# Patient Record
Sex: MALE | Race: WHITE | HISPANIC OR LATINO | Employment: FULL TIME | URBAN - METROPOLITAN AREA
[De-identification: names, ages, dates, MRNs, and addresses within clinical notes are randomized per-mention and may not be internally consistent; named-entity substitution may affect disease eponyms.]

---

## 2023-01-09 ENCOUNTER — HOSPITAL ENCOUNTER (OUTPATIENT)
Facility: HOSPITAL | Age: 48
Discharge: HOME | End: 2023-01-11
Attending: EMERGENCY MEDICINE | Admitting: HOSPITALIST

## 2023-01-09 DIAGNOSIS — J45.909 ASTHMA: ICD-10-CM

## 2023-01-09 DIAGNOSIS — J96.91 RESPIRATORY FAILURE WITH HYPOXIA: ICD-10-CM

## 2023-01-09 DIAGNOSIS — R00.0 TACHYCARDIA: ICD-10-CM

## 2023-01-09 DIAGNOSIS — R07.9 CHEST PAIN: ICD-10-CM

## 2023-01-09 DIAGNOSIS — R06.00 DYSPNEA: ICD-10-CM

## 2023-01-09 DIAGNOSIS — R09.89 SUSPECTED CHF (CONGESTIVE HEART FAILURE): ICD-10-CM

## 2023-01-09 DIAGNOSIS — J45.41 MODERATE PERSISTENT ASTHMA WITH EXACERBATION: Primary | ICD-10-CM

## 2023-01-09 PROBLEM — R06.2 WHEEZING: Status: ACTIVE | Noted: 2023-01-09

## 2023-01-09 PROBLEM — Z72.89 CURRENT VAPING ON SOME DAYS: Status: ACTIVE | Noted: 2023-01-09

## 2023-01-09 PROBLEM — R03.0 ELEVATED BLOOD PRESSURE READING WITHOUT DIAGNOSIS OF HYPERTENSION: Status: ACTIVE | Noted: 2023-01-09

## 2023-01-09 LAB
ALBUMIN SERPL BCP-MCNC: 4.7 G/DL (ref 3.5–5.2)
ALP SERPL-CCNC: 68 U/L (ref 55–135)
ALT SERPL W/O P-5'-P-CCNC: 38 U/L (ref 10–44)
ANION GAP SERPL CALC-SCNC: 9 MMOL/L (ref 8–16)
AST SERPL-CCNC: 27 U/L (ref 10–40)
BASOPHILS # BLD AUTO: 0.14 K/UL (ref 0–0.2)
BASOPHILS NFR BLD: 1 % (ref 0–1.9)
BILIRUB SERPL-MCNC: 0.5 MG/DL (ref 0.1–1)
BNP SERPL-MCNC: 23 PG/ML (ref 0–99)
BUN SERPL-MCNC: 14 MG/DL (ref 6–20)
CALCIUM SERPL-MCNC: 9.8 MG/DL (ref 8.7–10.5)
CHLORIDE SERPL-SCNC: 105 MMOL/L (ref 95–110)
CO2 SERPL-SCNC: 26 MMOL/L (ref 23–29)
CREAT SERPL-MCNC: 0.9 MG/DL (ref 0.5–1.4)
DIFFERENTIAL METHOD: ABNORMAL
EOSINOPHIL # BLD AUTO: 2 K/UL (ref 0–0.5)
EOSINOPHIL NFR BLD: 14.8 % (ref 0–8)
ERYTHROCYTE [DISTWIDTH] IN BLOOD BY AUTOMATED COUNT: 13.1 % (ref 11.5–14.5)
EST. GFR  (NO RACE VARIABLE): >60 ML/MIN/1.73 M^2
GLUCOSE SERPL-MCNC: 120 MG/DL (ref 70–110)
HCT VFR BLD AUTO: 46.1 % (ref 40–54)
HGB BLD-MCNC: 15.6 G/DL (ref 14–18)
IMM GRANULOCYTES # BLD AUTO: 0.04 K/UL (ref 0–0.04)
IMM GRANULOCYTES NFR BLD AUTO: 0.3 % (ref 0–0.5)
INFLUENZA A, MOLECULAR: NEGATIVE
INFLUENZA B, MOLECULAR: NEGATIVE
LYMPHOCYTES # BLD AUTO: 1.2 K/UL (ref 1–4.8)
LYMPHOCYTES NFR BLD: 8.6 % (ref 18–48)
MCH RBC QN AUTO: 30.4 PG (ref 27–31)
MCHC RBC AUTO-ENTMCNC: 33.8 G/DL (ref 32–36)
MCV RBC AUTO: 90 FL (ref 82–98)
MONOCYTES # BLD AUTO: 0.6 K/UL (ref 0.3–1)
MONOCYTES NFR BLD: 4.2 % (ref 4–15)
NEUTROPHILS # BLD AUTO: 9.6 K/UL (ref 1.8–7.7)
NEUTROPHILS NFR BLD: 71.1 % (ref 38–73)
NRBC BLD-RTO: 0 /100 WBC
PLATELET # BLD AUTO: 237 K/UL (ref 150–450)
PMV BLD AUTO: 10 FL (ref 9.2–12.9)
POTASSIUM SERPL-SCNC: 4.1 MMOL/L (ref 3.5–5.1)
PROT SERPL-MCNC: 8.2 G/DL (ref 6–8.4)
RBC # BLD AUTO: 5.13 M/UL (ref 4.6–6.2)
SARS-COV-2 RDRP RESP QL NAA+PROBE: NEGATIVE
SODIUM SERPL-SCNC: 140 MMOL/L (ref 136–145)
SPECIMEN SOURCE: NORMAL
TROPONIN I SERPL HS-MCNC: 7.4 PG/ML (ref 0–14.9)
WBC # BLD AUTO: 13.48 K/UL (ref 3.9–12.7)

## 2023-01-09 PROCEDURE — 85025 COMPLETE CBC W/AUTO DIFF WBC: CPT | Performed by: NURSE PRACTITIONER

## 2023-01-09 PROCEDURE — 25000242 PHARM REV CODE 250 ALT 637 W/ HCPCS: Performed by: NURSE PRACTITIONER

## 2023-01-09 PROCEDURE — 93010 ELECTROCARDIOGRAM REPORT: CPT | Mod: ,,, | Performed by: INTERNAL MEDICINE

## 2023-01-09 PROCEDURE — 99285 EMERGENCY DEPT VISIT HI MDM: CPT | Mod: 25

## 2023-01-09 PROCEDURE — 63600175 PHARM REV CODE 636 W HCPCS: Performed by: EMERGENCY MEDICINE

## 2023-01-09 PROCEDURE — 99900031 HC PATIENT EDUCATION (STAT)

## 2023-01-09 PROCEDURE — G0378 HOSPITAL OBSERVATION PER HR: HCPCS

## 2023-01-09 PROCEDURE — 25000242 PHARM REV CODE 250 ALT 637 W/ HCPCS: Performed by: EMERGENCY MEDICINE

## 2023-01-09 PROCEDURE — 84484 ASSAY OF TROPONIN QUANT: CPT | Performed by: EMERGENCY MEDICINE

## 2023-01-09 PROCEDURE — 99900035 HC TECH TIME PER 15 MIN (STAT)

## 2023-01-09 PROCEDURE — 96365 THER/PROPH/DIAG IV INF INIT: CPT

## 2023-01-09 PROCEDURE — 83880 ASSAY OF NATRIURETIC PEPTIDE: CPT | Performed by: EMERGENCY MEDICINE

## 2023-01-09 PROCEDURE — 80053 COMPREHEN METABOLIC PANEL: CPT | Performed by: NURSE PRACTITIONER

## 2023-01-09 PROCEDURE — 27000221 HC OXYGEN, UP TO 24 HOURS

## 2023-01-09 PROCEDURE — 63600175 PHARM REV CODE 636 W HCPCS: Performed by: HOSPITALIST

## 2023-01-09 PROCEDURE — 94640 AIRWAY INHALATION TREATMENT: CPT

## 2023-01-09 PROCEDURE — 94761 N-INVAS EAR/PLS OXIMETRY MLT: CPT

## 2023-01-09 PROCEDURE — 96375 TX/PRO/DX INJ NEW DRUG ADDON: CPT

## 2023-01-09 PROCEDURE — 93010 EKG 12-LEAD: ICD-10-PCS | Mod: ,,, | Performed by: INTERNAL MEDICINE

## 2023-01-09 PROCEDURE — 25000003 PHARM REV CODE 250: Performed by: HOSPITALIST

## 2023-01-09 PROCEDURE — 93005 ELECTROCARDIOGRAM TRACING: CPT | Performed by: INTERNAL MEDICINE

## 2023-01-09 PROCEDURE — 87502 INFLUENZA DNA AMP PROBE: CPT | Performed by: NURSE PRACTITIONER

## 2023-01-09 PROCEDURE — U0002 COVID-19 LAB TEST NON-CDC: HCPCS | Performed by: NURSE PRACTITIONER

## 2023-01-09 RX ORDER — ACETAMINOPHEN 325 MG/1
650 TABLET ORAL EVERY 4 HOURS PRN
Status: DISCONTINUED | OUTPATIENT
Start: 2023-01-09 | End: 2023-01-11 | Stop reason: HOSPADM

## 2023-01-09 RX ORDER — SODIUM CHLORIDE 0.9 % (FLUSH) 0.9 %
3 SYRINGE (ML) INJECTION EVERY 12 HOURS PRN
Status: DISCONTINUED | OUTPATIENT
Start: 2023-01-09 | End: 2023-01-11 | Stop reason: HOSPADM

## 2023-01-09 RX ORDER — DOXYCYCLINE 100 MG/1
100 CAPSULE ORAL EVERY 12 HOURS
Status: DISCONTINUED | OUTPATIENT
Start: 2023-01-09 | End: 2023-01-11 | Stop reason: HOSPADM

## 2023-01-09 RX ORDER — IPRATROPIUM BROMIDE AND ALBUTEROL SULFATE 2.5; .5 MG/3ML; MG/3ML
3 SOLUTION RESPIRATORY (INHALATION) EVERY 4 HOURS
Status: DISCONTINUED | OUTPATIENT
Start: 2023-01-10 | End: 2023-01-09

## 2023-01-09 RX ORDER — IPRATROPIUM BROMIDE AND ALBUTEROL SULFATE 2.5; .5 MG/3ML; MG/3ML
3 SOLUTION RESPIRATORY (INHALATION)
Status: COMPLETED | OUTPATIENT
Start: 2023-01-09 | End: 2023-01-09

## 2023-01-09 RX ORDER — IBUPROFEN 200 MG
24 TABLET ORAL
Status: DISCONTINUED | OUTPATIENT
Start: 2023-01-09 | End: 2023-01-11 | Stop reason: HOSPADM

## 2023-01-09 RX ORDER — IPRATROPIUM BROMIDE 0.5 MG/2.5ML
0.5 SOLUTION RESPIRATORY (INHALATION)
Status: COMPLETED | OUTPATIENT
Start: 2023-01-09 | End: 2023-01-09

## 2023-01-09 RX ORDER — IBUPROFEN 200 MG
16 TABLET ORAL
Status: DISCONTINUED | OUTPATIENT
Start: 2023-01-09 | End: 2023-01-11 | Stop reason: HOSPADM

## 2023-01-09 RX ORDER — HYDRALAZINE HYDROCHLORIDE 20 MG/ML
10 INJECTION INTRAMUSCULAR; INTRAVENOUS EVERY 8 HOURS PRN
Status: DISCONTINUED | OUTPATIENT
Start: 2023-01-09 | End: 2023-01-11 | Stop reason: HOSPADM

## 2023-01-09 RX ORDER — IPRATROPIUM BROMIDE AND ALBUTEROL SULFATE 2.5; .5 MG/3ML; MG/3ML
3 SOLUTION RESPIRATORY (INHALATION) EVERY 6 HOURS
Status: DISCONTINUED | OUTPATIENT
Start: 2023-01-10 | End: 2023-01-11 | Stop reason: HOSPADM

## 2023-01-09 RX ORDER — FUROSEMIDE 10 MG/ML
40 INJECTION INTRAMUSCULAR; INTRAVENOUS ONCE
Status: COMPLETED | OUTPATIENT
Start: 2023-01-09 | End: 2023-01-09

## 2023-01-09 RX ORDER — ACETAMINOPHEN 325 MG/1
650 TABLET ORAL EVERY 8 HOURS PRN
Status: DISCONTINUED | OUTPATIENT
Start: 2023-01-09 | End: 2023-01-11 | Stop reason: HOSPADM

## 2023-01-09 RX ORDER — SODIUM,POTASSIUM PHOSPHATES 280-250MG
2 POWDER IN PACKET (EA) ORAL
Status: DISCONTINUED | OUTPATIENT
Start: 2023-01-09 | End: 2023-01-11 | Stop reason: HOSPADM

## 2023-01-09 RX ORDER — SIMETHICONE 80 MG
1 TABLET,CHEWABLE ORAL 4 TIMES DAILY PRN
Status: DISCONTINUED | OUTPATIENT
Start: 2023-01-09 | End: 2023-01-11 | Stop reason: HOSPADM

## 2023-01-09 RX ORDER — POLYETHYLENE GLYCOL 3350 17 G/17G
17 POWDER, FOR SOLUTION ORAL 2 TIMES DAILY
Status: DISCONTINUED | OUTPATIENT
Start: 2023-01-09 | End: 2023-01-11 | Stop reason: HOSPADM

## 2023-01-09 RX ORDER — HYDROCODONE BITARTRATE AND ACETAMINOPHEN 5; 325 MG/1; MG/1
1 TABLET ORAL EVERY 6 HOURS PRN
Status: DISCONTINUED | OUTPATIENT
Start: 2023-01-09 | End: 2023-01-11 | Stop reason: HOSPADM

## 2023-01-09 RX ORDER — GLUCAGON 1 MG
1 KIT INJECTION
Status: DISCONTINUED | OUTPATIENT
Start: 2023-01-09 | End: 2023-01-11 | Stop reason: HOSPADM

## 2023-01-09 RX ORDER — LANOLIN ALCOHOL/MO/W.PET/CERES
800 CREAM (GRAM) TOPICAL
Status: DISCONTINUED | OUTPATIENT
Start: 2023-01-09 | End: 2023-01-11 | Stop reason: HOSPADM

## 2023-01-09 RX ORDER — NALOXONE HCL 0.4 MG/ML
0.02 VIAL (ML) INJECTION
Status: DISCONTINUED | OUTPATIENT
Start: 2023-01-09 | End: 2023-01-11 | Stop reason: HOSPADM

## 2023-01-09 RX ORDER — ONDANSETRON 2 MG/ML
4 INJECTION INTRAMUSCULAR; INTRAVENOUS EVERY 8 HOURS PRN
Status: DISCONTINUED | OUTPATIENT
Start: 2023-01-09 | End: 2023-01-11 | Stop reason: HOSPADM

## 2023-01-09 RX ORDER — METHYLPREDNISOLONE SOD SUCC 125 MG
80 VIAL (EA) INJECTION
Status: DISCONTINUED | OUTPATIENT
Start: 2023-01-10 | End: 2023-01-11 | Stop reason: HOSPADM

## 2023-01-09 RX ORDER — TALC
6 POWDER (GRAM) TOPICAL NIGHTLY PRN
Status: DISCONTINUED | OUTPATIENT
Start: 2023-01-09 | End: 2023-01-11 | Stop reason: HOSPADM

## 2023-01-09 RX ORDER — HYDROCODONE BITARTRATE AND ACETAMINOPHEN 10; 325 MG/1; MG/1
1 TABLET ORAL EVERY 6 HOURS PRN
Status: DISCONTINUED | OUTPATIENT
Start: 2023-01-09 | End: 2023-01-11 | Stop reason: HOSPADM

## 2023-01-09 RX ORDER — MAGNESIUM SULFATE HEPTAHYDRATE 40 MG/ML
2 INJECTION, SOLUTION INTRAVENOUS ONCE
Status: COMPLETED | OUTPATIENT
Start: 2023-01-09 | End: 2023-01-09

## 2023-01-09 RX ADMIN — MAGNESIUM SULFATE HEPTAHYDRATE 2 G: 40 INJECTION, SOLUTION INTRAVENOUS at 08:01

## 2023-01-09 RX ADMIN — IPRATROPIUM BROMIDE 0.5 MG: 0.5 SOLUTION RESPIRATORY (INHALATION) at 07:01

## 2023-01-09 RX ADMIN — POLYETHYLENE GLYCOL 3350 17 G: 17 POWDER, FOR SOLUTION ORAL at 11:01

## 2023-01-09 RX ADMIN — METHYLPREDNISOLONE SODIUM SUCCINATE 80 MG: 40 INJECTION, POWDER, FOR SOLUTION INTRAMUSCULAR; INTRAVENOUS at 08:01

## 2023-01-09 RX ADMIN — DOXYCYCLINE HYCLATE 100 MG: 100 CAPSULE ORAL at 11:01

## 2023-01-09 RX ADMIN — FUROSEMIDE 40 MG: 10 INJECTION, SOLUTION INTRAVENOUS at 11:01

## 2023-01-09 RX ADMIN — IPRATROPIUM BROMIDE AND ALBUTEROL SULFATE 3 ML: 2.5; .5 SOLUTION RESPIRATORY (INHALATION) at 07:01

## 2023-01-10 ENCOUNTER — CLINICAL SUPPORT (OUTPATIENT)
Dept: CARDIOLOGY | Facility: HOSPITAL | Age: 48
End: 2023-01-10
Attending: HOSPITALIST

## 2023-01-10 VITALS — WEIGHT: 192 LBS | HEIGHT: 71 IN | BODY MASS INDEX: 26.88 KG/M2

## 2023-01-10 LAB
ALBUMIN SERPL BCP-MCNC: 4.6 G/DL (ref 3.5–5.2)
ALP SERPL-CCNC: 61 U/L (ref 55–135)
ALT SERPL W/O P-5'-P-CCNC: 35 U/L (ref 10–44)
ANION GAP SERPL CALC-SCNC: 12 MMOL/L (ref 8–16)
AORTIC ROOT ANNULUS: 3.16 CM
AORTIC VALVE CUSP SEPERATION: 1.96 CM
AST SERPL-CCNC: 23 U/L (ref 10–40)
AV INDEX (PROSTH): 0.88
AV MEAN GRADIENT: 3 MMHG
AV PEAK GRADIENT: 6 MMHG
AV VALVE AREA: 2.84 CM2
AV VELOCITY RATIO: 0.81
BILIRUB SERPL-MCNC: 0.5 MG/DL (ref 0.1–1)
BSA FOR ECHO PROCEDURE: 2.09 M2
BUN SERPL-MCNC: 14 MG/DL (ref 6–20)
CALCIUM SERPL-MCNC: 9.5 MG/DL (ref 8.7–10.5)
CHLORIDE SERPL-SCNC: 99 MMOL/L (ref 95–110)
CO2 SERPL-SCNC: 25 MMOL/L (ref 23–29)
CREAT SERPL-MCNC: 0.8 MG/DL (ref 0.5–1.4)
CV ECHO LV RWT: 0.36 CM
DOP CALC AO PEAK VEL: 1.26 M/S
DOP CALC AO VTI: 20.5 CM
DOP CALC LVOT AREA: 3.2 CM2
DOP CALC LVOT DIAMETER: 2.03 CM
DOP CALC LVOT PEAK VEL: 1.02 M/S
DOP CALC LVOT STROKE VOLUME: 58.23 CM3
DOP CALCLVOT PEAK VEL VTI: 18 CM
E WAVE DECELERATION TIME: 196.05 MSEC
E/A RATIO: 1.27
E/E' RATIO: 9.4 M/S
ECHO LV POSTERIOR WALL: 0.78 CM (ref 0.6–1.1)
EJECTION FRACTION: 60 %
EST. GFR  (NO RACE VARIABLE): >60 ML/MIN/1.73 M^2
FRACTIONAL SHORTENING: 30 % (ref 28–44)
GLUCOSE SERPL-MCNC: 159 MG/DL (ref 70–110)
INTERVENTRICULAR SEPTUM: 0.99 CM (ref 0.6–1.1)
LEFT ATRIUM SIZE: 2.81 CM
LEFT INTERNAL DIMENSION IN SYSTOLE: 3.04 CM (ref 2.1–4)
LEFT VENTRICLE DIASTOLIC VOLUME INDEX: 41.31 ML/M2
LEFT VENTRICLE DIASTOLIC VOLUME: 85.51 ML
LEFT VENTRICLE MASS INDEX: 59 G/M2
LEFT VENTRICLE SYSTOLIC VOLUME INDEX: 17.5 ML/M2
LEFT VENTRICLE SYSTOLIC VOLUME: 36.14 ML
LEFT VENTRICULAR INTERNAL DIMENSION IN DIASTOLE: 4.35 CM (ref 3.5–6)
LEFT VENTRICULAR MASS: 122.83 G
LV LATERAL E/E' RATIO: 9.4 M/S
LV SEPTAL E/E' RATIO: 9.4 M/S
LVOT MG: 2.52 MMHG
LVOT MV: 0.77 CM/S
MAGNESIUM SERPL-MCNC: 2 MG/DL (ref 1.6–2.6)
MV PEAK A VEL: 0.74 M/S
MV PEAK E VEL: 0.94 M/S
MV STENOSIS PRESSURE HALF TIME: 56.85 MS
MV VALVE AREA P 1/2 METHOD: 3.87 CM2
PHOSPHATE SERPL-MCNC: 3.9 MG/DL (ref 2.7–4.5)
PISA TR MAX VEL: 2.41 M/S
POTASSIUM SERPL-SCNC: 4.1 MMOL/L (ref 3.5–5.1)
PROT SERPL-MCNC: 8.2 G/DL (ref 6–8.4)
PV MV: 0.98 M/S
PV PEAK VELOCITY: 1.27 CM/S
RA PRESSURE: 8 MMHG
RA VOL SYS: 12.45 ML
RV TISSUE DOPPLER FREE WALL SYSTOLIC VELOCITY 1 (APICAL 4 CHAMBER VIEW): 0.01 CM/S
SODIUM SERPL-SCNC: 136 MMOL/L (ref 136–145)
TDI LATERAL: 0.1 M/S
TDI SEPTAL: 0.1 M/S
TDI: 0.1 M/S
TR MAX PG: 23 MMHG
TRICUSPID ANNULAR PLANE SYSTOLIC EXCURSION: 1.52 CM
TV REST PULMONARY ARTERY PRESSURE: 31 MMHG

## 2023-01-10 PROCEDURE — 93306 TTE W/DOPPLER COMPLETE: CPT | Mod: 26,,, | Performed by: INTERNAL MEDICINE

## 2023-01-10 PROCEDURE — 25000242 PHARM REV CODE 250 ALT 637 W/ HCPCS: Performed by: HOSPITALIST

## 2023-01-10 PROCEDURE — 84100 ASSAY OF PHOSPHORUS: CPT | Performed by: HOSPITALIST

## 2023-01-10 PROCEDURE — 87070 CULTURE OTHR SPECIMN AEROBIC: CPT | Performed by: HOSPITALIST

## 2023-01-10 PROCEDURE — 96376 TX/PRO/DX INJ SAME DRUG ADON: CPT

## 2023-01-10 PROCEDURE — 80053 COMPREHEN METABOLIC PANEL: CPT | Performed by: HOSPITALIST

## 2023-01-10 PROCEDURE — 93306 TTE W/DOPPLER COMPLETE: CPT

## 2023-01-10 PROCEDURE — 99900031 HC PATIENT EDUCATION (STAT)

## 2023-01-10 PROCEDURE — 63600175 PHARM REV CODE 636 W HCPCS: Performed by: HOSPITALIST

## 2023-01-10 PROCEDURE — 25000003 PHARM REV CODE 250: Performed by: HOSPITALIST

## 2023-01-10 PROCEDURE — 94761 N-INVAS EAR/PLS OXIMETRY MLT: CPT

## 2023-01-10 PROCEDURE — 94640 AIRWAY INHALATION TREATMENT: CPT

## 2023-01-10 PROCEDURE — 93306 ECHO (CUPID ONLY): ICD-10-PCS | Mod: 26,,, | Performed by: INTERNAL MEDICINE

## 2023-01-10 PROCEDURE — 93010 ELECTROCARDIOGRAM REPORT: CPT | Mod: ,,, | Performed by: INTERNAL MEDICINE

## 2023-01-10 PROCEDURE — 96375 TX/PRO/DX INJ NEW DRUG ADDON: CPT

## 2023-01-10 PROCEDURE — 87205 SMEAR GRAM STAIN: CPT | Performed by: HOSPITALIST

## 2023-01-10 PROCEDURE — G0378 HOSPITAL OBSERVATION PER HR: HCPCS

## 2023-01-10 PROCEDURE — 93005 ELECTROCARDIOGRAM TRACING: CPT | Performed by: INTERNAL MEDICINE

## 2023-01-10 PROCEDURE — 27000221 HC OXYGEN, UP TO 24 HOURS

## 2023-01-10 PROCEDURE — 93010 EKG 12-LEAD: ICD-10-PCS | Mod: ,,, | Performed by: INTERNAL MEDICINE

## 2023-01-10 PROCEDURE — 83735 ASSAY OF MAGNESIUM: CPT | Performed by: HOSPITALIST

## 2023-01-10 PROCEDURE — 99900035 HC TECH TIME PER 15 MIN (STAT)

## 2023-01-10 RX ORDER — HYDROCHLOROTHIAZIDE 12.5 MG/1
12.5 TABLET ORAL DAILY
Status: DISCONTINUED | OUTPATIENT
Start: 2023-01-10 | End: 2023-01-11 | Stop reason: HOSPADM

## 2023-01-10 RX ADMIN — HYDROCHLOROTHIAZIDE 12.5 MG: 12.5 TABLET ORAL at 05:01

## 2023-01-10 RX ADMIN — IPRATROPIUM BROMIDE AND ALBUTEROL SULFATE 3 ML: 2.5; .5 SOLUTION RESPIRATORY (INHALATION) at 01:01

## 2023-01-10 RX ADMIN — ACETAMINOPHEN 650 MG: 325 TABLET ORAL at 02:01

## 2023-01-10 RX ADMIN — DOXYCYCLINE HYCLATE 100 MG: 100 CAPSULE ORAL at 08:01

## 2023-01-10 RX ADMIN — IPRATROPIUM BROMIDE AND ALBUTEROL SULFATE 3 ML: 2.5; .5 SOLUTION RESPIRATORY (INHALATION) at 08:01

## 2023-01-10 RX ADMIN — ACETAMINOPHEN 650 MG: 325 TABLET ORAL at 09:01

## 2023-01-10 RX ADMIN — IPRATROPIUM BROMIDE AND ALBUTEROL SULFATE 3 ML: 2.5; .5 SOLUTION RESPIRATORY (INHALATION) at 12:01

## 2023-01-10 RX ADMIN — METHYLPREDNISOLONE SODIUM SUCCINATE 80 MG: 125 INJECTION, POWDER, FOR SOLUTION INTRAMUSCULAR; INTRAVENOUS at 01:01

## 2023-01-10 RX ADMIN — METHYLPREDNISOLONE SODIUM SUCCINATE 80 MG: 125 INJECTION, POWDER, FOR SOLUTION INTRAMUSCULAR; INTRAVENOUS at 08:01

## 2023-01-10 RX ADMIN — IPRATROPIUM BROMIDE AND ALBUTEROL SULFATE 3 ML: 2.5; .5 SOLUTION RESPIRATORY (INHALATION) at 09:01

## 2023-01-10 RX ADMIN — HYDRALAZINE HYDROCHLORIDE 10 MG: 20 INJECTION, SOLUTION INTRAMUSCULAR; INTRAVENOUS at 09:01

## 2023-01-10 NOTE — ED TRIAGE NOTES
Pt cc/o worsen SOA with wheezing x 2 weeks seen at [facility] given im decadron; pt refused EMS and came POV here

## 2023-01-10 NOTE — H&P
[facility] Medicine  History & Physical    Patient Name: Khari Durbin  MRN: [id number]  Patient Class: OP- Observation  Admission Date: 1/9/2023  Attending Physician: Zena Reddy MD   Primary Care Provider: No primary care provider on file.         Patient information was obtained from patient, spouse/SO and ER records.     Subjective:     Principal Problem:Moderate persistent asthma with exacerbation    Chief Complaint:   Chief Complaint   Patient presents with    Shortness of Breath        HPI: 47-year-old gentleman with prior history of childhood asthma came with chief complaint of shortness of breath.  Upon further asking patient mentioned that 3 weeks ago he contracted very bad upper respiratory infection however subsequently got better.  Unfortunately for last 1 week he has worsening dyspnea, wheezing and decided to come to ED. otherwise denies any fever, chills, headache, dizziness, chest pain, palpitations, nausea, vomiting, bladder or bowel symptoms.     Past medical history:  Childhood asthma  Family history:  Reviewed and noncontributory  Social history:  Quit smoking 9 years ago however he continues to vape, denies excessive alcohol use or recreational drug use, works in warehouse with very ester environment  Surgical history:  Reviewed and noncontributory      No past medical history on file.    No past surgical history on file.    Review of patient's allergies indicates:  Not on File    No current facility-administered medications on file prior to encounter.     No current outpatient medications on file prior to encounter.     Family History    None       Tobacco Use    Smoking status: Not on file    Smokeless tobacco: Not on file   Substance and Sexual Activity    Alcohol use: Not on file    Drug use: Not on file    Sexual activity: Not on file     Review of Systems   Constitutional:  Positive for fatigue. Negative for activity change and appetite change.    HENT:  Negative for congestion and sore throat.    Eyes:  Negative for discharge and visual disturbance.   Respiratory:  Positive for shortness of breath and wheezing. Negative for cough and chest tightness.    Cardiovascular:  Negative for chest pain and leg swelling.   Gastrointestinal:  Negative for abdominal pain and nausea.   Genitourinary:  Negative for dysuria and hematuria.   Musculoskeletal:  Negative for myalgias.   Skin:  Negative for pallor and rash.   Neurological:  Negative for dizziness and weakness.   Psychiatric/Behavioral:  Negative for behavioral problems. The patient is not nervous/anxious.    All other systems reviewed and are negative.  Objective:     Vital Signs (Most Recent):  Temp: 97.8 °F (36.6 °C) (01/09/23 1920)  Pulse: 109 (01/09/23 1955)  Resp: (!) 22 (01/09/23 1955)  BP: (!) 151/101 (01/09/23 1955)  SpO2: (!) 94 % (01/09/23 1955)   Vital Signs (24h Range):  Temp:  [97.8 °F (36.6 °C)] 97.8 °F (36.6 °C)  Pulse:  [106-110] 109  Resp:  [20-26] 22  SpO2:  [90 %-95 %] 94 %  BP: (142-151)/() 151/101     Weight: 88.5 kg (195 lb)  Body mass index is 27.2 kg/m².    Physical Exam  Vitals and nursing note reviewed.   Constitutional:       General: He is in acute distress.      Appearance: He is well-developed.   HENT:      Head: Atraumatic.   Neck:      Vascular: No JVD.   Cardiovascular:      Rate and Rhythm: Normal rate and regular rhythm.      Heart sounds: Normal heart sounds. No murmur heard.    No gallop.   Pulmonary:      Effort: Respiratory distress present.      Breath sounds: Wheezing present.      Comments: On supplemental oxygen  Abdominal:      General: Bowel sounds are normal. There is no distension.      Palpations: Abdomen is soft.      Tenderness: There is no guarding or rebound.   Musculoskeletal:      Cervical back: Neck supple.   Lymphadenopathy:      Cervical: No cervical adenopathy.   Skin:     General: Skin is warm.      Capillary Refill: Capillary refill takes less  than 2 seconds.      Findings: No rash.   Neurological:      Mental Status: He is alert and oriented to person, place, and time.      Cranial Nerves: No cranial nerve deficit.   Psychiatric:         Behavior: Behavior normal.           Significant Labs: All pertinent labs within the past 24 hours have been reviewed.    Significant Imaging: I have reviewed all pertinent imaging results/findings within the past 24 hours.    Assessment/Plan:     47-year-old gentleman with history of childhood asthma struggling with URI for last 3 weeks came with severe wheezing, respiratory distress.     Active Hospital Problems    Diagnosis  POA    *Moderate persistent asthma with exacerbation [J45.41]  Yes    Wheezing [R06.2]  Yes    Current vaping on some days [Z72.89]  Not Applicable    Elevated blood pressure reading without diagnosis of hypertension [R03.0]  Yes      Resolved Hospital Problems   No resolved problems to display.       Plan:  Admit to med [facility]-observation  Overall clinical picture consistent with asthma exacerbation, of note patient vapes nicotine products  DuoNebs, IV steroids, doxycycline  Check procalcitonin  Wean oxygen as tolerated  Counseled extensively on vaping cessation and educated on vaping associated lung injury  Multiple elevated blood pressure reading noted, will give 1 dose of IV Lasix.  Check 2D echo  Pt will need outpatient PFTs    VTE Risk Mitigation (From admission, onward)         Ordered     IP VTE LOW RISK PATIENT  Once         01/09/23 2133     Place sequential compression device  Until discontinued         01/09/23 2133                   Zena Reddy MD  Department of Hospital Medicine   [facility]

## 2023-01-10 NOTE — CARE UPDATE
01/09/23 1955   Patient Assessment/Suction   Level of Consciousness (AVPU) alert   Respiratory Effort Short of breath   Expansion/Accessory Muscles/Retractions no use of accessory muscles   All Lung Fields Breath Sounds Anterior:;wheezes, expiratory   Rhythm/Pattern, Respiratory shortness of breath   Cough Frequency frequent   Cough Type dry   PRE-TX-O2   Device (Oxygen Therapy) nasal cannula   $ Is the patient on Low Flow Oxygen? Yes   Flow (L/min) 2   SpO2 (!) 94 %   Pulse Oximetry Type Continuous   $ Pulse Oximetry - Multiple Charge Pulse Oximetry - Multiple   Pulse 109   Resp (!) 22   BP (!) 151/101   Aerosol Therapy   $ Aerosol Therapy Charges Aerosol Treatment   Daily Review of Necessity (SVN) completed   Respiratory Treatment Status (SVN) given   Treatment Route (SVN) mask;oxygen   Patient Position (SVN) HOB elevated   Post Treatment Assessment (SVN) breath sounds unchanged   Signs of Intolerance (SVN) none   Breath Sounds Post-Respiratory Treatment   Post-treatment Heart Rate (beats/min) 108   Post-treatment Resp Rate (breaths/min) 22   Education   $ Education Bronchodilator;15 min   Respiratory Evaluation   $ Care Plan Tech Time 15 min

## 2023-01-10 NOTE — HPI
47-year-old gentleman with prior history of childhood asthma came with chief complaint of shortness of breath.  Upon further asking patient mentioned that 3 weeks ago he contracted very bad upper respiratory infection however subsequently got better.  Unfortunately for last 1 week he has worsening dyspnea, wheezing and decided to come to ED. otherwise denies any fever, chills, headache, dizziness, chest pain, palpitations, nausea, vomiting, bladder or bowel symptoms.     Past medical history:  Childhood asthma  Family history:  Reviewed and noncontributory  Social history:  Quit smoking 9 years ago however he continues to vape, denies excessive alcohol use or recreational drug use, works in warehouse with very ester environment  Surgical history:  Reviewed and noncontributory

## 2023-01-10 NOTE — PLAN OF CARE
[facility]  Initial Discharge Assessment       Primary Care Provider: Primary Doctor No    Admission Diagnosis: Moderate persistent asthma with exacerbation [J45.41]    Admission Date: 1/9/2023  Expected Discharge Date:     Patient verified information on face sheet. No dialysis, no coumadin, no nebulizer.  Patient reported that he is currently not on any medications.  Reported that his spouse will transport him home upon discharge.  Patient confirmed that he does not have a pcp.     CM to provide pt with information for access health.    Discharge Barriers Identified: None    Payor: /     Extended Emergency Contact Information  Primary Emergency Contact: Johnathan Ross  Address: [address] [address]  Home Phone: [phone]  Mobile Phone: [phone]  Relation: Spouse  Preferred language: English   needed? No    Discharge Plan A: Home with family  Discharge Plan B: Home with family    No Pharmacies Listed    Initial Assessment (most recent)       Adult Discharge Assessment - 01/10/23 1731          Discharge Assessment    Assessment Type Discharge Planning Assessment     Confirmed/corrected address, phone number and insurance Yes     Confirmed Demographics Correct on Facesheet     Source of Information patient     Does patient/caregiver understand observation status --   n/a    Communicated DAMIAN with patient/caregiver Date not available/Unable to determine     Reason For Admission Moderate persistent asthma with exacerbation     People in Home spouse     Facility Arrived From: home     Do you expect to return to your current living situation? Yes     Do you have help at home or someone to help you manage your care at home? Yes     Who are your caregiver(s) and their phone number(s)? Johnathan Ross (spouse) [phone]     Prior to hospitilization cognitive status: Unable to Assess     Current cognitive status: Alert/Oriented      Walking or Climbing Stairs --   no issues    Dressing/Bathing --   no issues    Equipment Currently Used at Home none     Patient currently being followed by outpatient case management? No     Do you currently have service(s) that help you manage your care at home? No     Do you take prescription medications? No     Do you have prescription coverage? No     Do you have any problems affording any of your prescribed medications? TBD     Is the patient taking medications as prescribed? --   n/a    Who is going to help you get home at discharge? spouse     How do you get to doctors appointments? car, drives self     Are you on dialysis? No     Do you take coumadin? No     Discharge Plan A Home with family     Discharge Plan B Home with family     DME Needed Upon Discharge  none     Discharge Plan discussed with: Patient     Discharge Barriers Identified None

## 2023-01-10 NOTE — ED PROVIDER NOTES
Dictation #1  MRN:[id number]  CSN:[id number] Encounter Date: 1/9/2023       History     Chief Complaint   Patient presents with    Shortness of Breath     47-year-old male with a past medical history of asthma presents emergency department cough, shortness of breath.  He has had a cough , dry nonproductive shortness of breath.  It has been present for the last 3 weeks.  He has been getting worse per his wife.  He took a Z-Rony and it really has not helped.  He has been doing occasional breathing treatments but it has not really helped.  No fever, no chills.  He has been tested for COVID and flu both negative several days ago.  His wife tested negative for COVID yesterday.  Patient went to urgent care earlier today and was given IM Decadron and referred to the emergency department.  It is reported that his oxygen saturation was in the low 80s.  He went to send him by ambulance but he refused and came in private vehicle.  Initial sat here is 90%.  Patient tachypneic and speaking in short phrases.  History limited by the patient's difficulty breathing.  History obtained primarily from the wife    Review of patient's allergies indicates:  Not on File  No past medical history on file.  No past surgical history on file.  No family history on file.     Review of Systems   Constitutional:  Negative for chills and fever.   HENT:  Negative for sore throat.    Respiratory:  Positive for cough, shortness of breath and wheezing.    Cardiovascular:  Negative for chest pain and palpitations.   Gastrointestinal:  Negative for abdominal pain, nausea and vomiting.   Genitourinary:  Negative for dysuria.   Musculoskeletal:  Negative for back pain.   Skin:  Negative for rash.   Neurological:  Negative for weakness and headaches.   Hematological:  Does not bruise/bleed easily.   All other systems reviewed and are negative.    Physical Exam     Initial Vitals [01/09/23 1920]   BP Pulse Resp Temp SpO2   (!) 142/87 110 (!) 26 97.8 °F (36.6 °C)  (!) 90 %      MAP       --         Physical Exam    Nursing note and vitals reviewed.  Constitutional: He appears well-developed and well-nourished. He is not diaphoretic. No distress.   HENT:   Head: Normocephalic and atraumatic.   Mouth/Throat: Oropharynx is clear and moist. No oropharyngeal exudate.   Eyes: Conjunctivae and EOM are normal. Pupils are equal, round, and reactive to light.   Neck: Neck supple. No tracheal deviation present.   Normal range of motion.  Cardiovascular:  Regular rhythm, normal heart sounds and intact distal pulses.           No murmur heard.  Tachycardic   Pulmonary/Chest: No stridor. No respiratory distress. He has wheezes (diffuse expiratory bilaterally). He has no rhonchi. He has no rales.   Supraclavicular retractions, using accessory muscles, prolonged exhalation.   Abdominal: Abdomen is soft. Bowel sounds are normal. He exhibits no distension. There is no abdominal tenderness. There is no rebound and no guarding.   Musculoskeletal:         General: No tenderness or edema. Normal range of motion.      Cervical back: Normal range of motion and neck supple.     Neurological: He is alert and oriented to person, place, and time. He has normal strength and normal reflexes. No cranial nerve deficit or sensory deficit. GCS score is 15. GCS eye subscore is 4. GCS verbal subscore is 5. GCS motor subscore is 6.   Skin: Skin is warm and dry. Capillary refill takes less than 2 seconds. No rash noted. No erythema. No pallor.   Psychiatric: He has a normal mood and affect. His behavior is normal. Thought content normal.       ED Course   Procedures  Labs Reviewed   CBC W/ AUTO DIFFERENTIAL - Abnormal; Notable for the following components:       Result Value    WBC 13.48 (*)     Gran # (ANC) 9.6 (*)     Eos # 2.0 (*)     Lymph % 8.6 (*)     Eosinophil % 14.8 (*)     All other components within normal limits   COMPREHENSIVE METABOLIC PANEL - Abnormal; Notable for the following components:     Glucose 120 (*)     All other components within normal limits   CULTURE, RESPIRATORY   INFLUENZA A AND B ANTIGEN    Narrative:     Specimen Source->Nasopharyngeal Swab   SARS-COV-2 RNA AMPLIFICATION, QUAL   B-TYPE NATRIURETIC PEPTIDE   TROPONIN I HIGH SENSITIVITY   PROCALCITONIN          Results for orders placed or performed during the hospital encounter of 01/09/23   CBC auto differential   Result Value Ref Range    WBC 13.48 (H) 3.90 - 12.70 K/uL    RBC 5.13 4.60 - 6.20 M/uL    Hemoglobin 15.6 14.0 - 18.0 g/dL    Hematocrit 46.1 40.0 - 54.0 %    MCV 90 82 - 98 fL    MCH 30.4 27.0 - 31.0 pg    MCHC 33.8 32.0 - 36.0 g/dL    RDW 13.1 11.5 - 14.5 %    Platelets 237 150 - 450 K/uL    MPV 10.0 9.2 - 12.9 fL    Immature Granulocytes 0.3 0.0 - 0.5 %    Gran # (ANC) 9.6 (H) 1.8 - 7.7 K/uL    Immature Grans (Abs) 0.04 0.00 - 0.04 K/uL    Lymph # 1.2 1.0 - 4.8 K/uL    Mono # 0.6 0.3 - 1.0 K/uL    Eos # 2.0 (H) 0.0 - 0.5 K/uL    Baso # 0.14 0.00 - 0.20 K/uL    nRBC 0 0 /100 WBC    Gran % 71.1 38.0 - 73.0 %    Lymph % 8.6 (L) 18.0 - 48.0 %    Mono % 4.2 4.0 - 15.0 %    Eosinophil % 14.8 (H) 0.0 - 8.0 %    Basophil % 1.0 0.0 - 1.9 %    Differential Method Automated    Comprehensive metabolic panel   Result Value Ref Range    Sodium 140 136 - 145 mmol/L    Potassium 4.1 3.5 - 5.1 mmol/L    Chloride 105 95 - 110 mmol/L    CO2 26 23 - 29 mmol/L    Glucose 120 (H) 70 - 110 mg/dL    BUN 14 6 - 20 mg/dL    Creatinine 0.9 0.5 - 1.4 mg/dL    Calcium 9.8 8.7 - 10.5 mg/dL    Total Protein 8.2 6.0 - 8.4 g/dL    Albumin 4.7 3.5 - 5.2 g/dL    Total Bilirubin 0.5 0.1 - 1.0 mg/dL    Alkaline Phosphatase 68 55 - 135 U/L    AST 27 10 - 40 U/L    ALT 38 10 - 44 U/L    Anion Gap 9 8 - 16 mmol/L    eGFR >60.0 >60 mL/min/1.73 m^2   Influenza antigen Nasopharyngeal Swab   Result Value Ref Range    Influenza A, Molecular Negative Negative    Influenza B, Molecular Negative Negative    Flu A & B Source Nasal swab    COVID-19 Rapid Screening   Result  Value Ref Range    SARS-CoV-2 RNA, Amplification, Qual Negative Negative   Brain natriuretic peptide   Result Value Ref Range    BNP 23 0 - 99 pg/mL   Troponin I High Sensitivity   Result Value Ref Range    Troponin I High Sensitivity 7.4 0.0 - 14.9 pg/mL     X-Ray Chest AP Portable    (Results Pending)       Imaging Results              X-Ray Chest AP Portable (In process)                      Medications   sodium chloride 0.9% flush 3 mL (has no administration in time range)   melatonin tablet 6 mg (has no administration in time range)   ondansetron injection 4 mg (has no administration in time range)   polyethylene glycol packet 17 g (has no administration in time range)   acetaminophen tablet 650 mg (has no administration in time range)   simethicone chewable tablet 80 mg (has no administration in time range)   acetaminophen tablet 650 mg (has no administration in time range)   HYDROcodone-acetaminophen 5-325 mg per tablet 1 tablet (has no administration in time range)   HYDROcodone-acetaminophen  mg per tablet 1 tablet (has no administration in time range)   naloxone 0.4 mg/mL injection 0.02 mg (has no administration in time range)   potassium bicarbonate disintegrating tablet 50 mEq (has no administration in time range)   potassium bicarbonate disintegrating tablet 35 mEq (has no administration in time range)   potassium bicarbonate disintegrating tablet 60 mEq (has no administration in time range)   magnesium oxide tablet 800 mg (has no administration in time range)   magnesium oxide tablet 800 mg (has no administration in time range)   potassium, sodium phosphates 280-160-250 mg packet 2 packet (has no administration in time range)   potassium, sodium phosphates 280-160-250 mg packet 2 packet (has no administration in time range)   potassium, sodium phosphates 280-160-250 mg packet 2 packet (has no administration in time range)   glucose chewable tablet 16 g (has no administration in time range)    glucose chewable tablet 24 g (has no administration in time range)   glucagon (human recombinant) injection 1 mg (has no administration in time range)   albuterol-ipratropium 2.5 mg-0.5 mg/3 mL nebulizer solution 3 mL (has no administration in time range)   methylPREDNISolone sodium succinate injection 80 mg (has no administration in time range)   doxycycline capsule 100 mg (has no administration in time range)   furosemide injection 40 mg (has no administration in time range)   albuterol-ipratropium 2.5 mg-0.5 mg/3 mL nebulizer solution 3 mL (3 mLs Nebulization Given 1/9/23 1938)   methylPREDNISolone sodium succinate injection 80 mg (80 mg Intravenous Given 1/9/23 2012)   ipratropium 0.02 % nebulizer solution 0.5 mg (0.5 mg Nebulization Given 1/9/23 1955)   magnesium sulfate 2g in water 50mL IVPB (premix) (0 g Intravenous Stopped 1/9/23 2035)     Medical Decision Making:   Clinical Tests:   Lab Tests: Ordered and Reviewed  Radiological Study: Ordered and Reviewed  Medical Tests: Ordered and Reviewed  ED Management:  47-year-old male presents emergency department shortness of breath.  He is sent in by urgent care for with concern for asthma exacerbation, he is diffusely wheezing tachypneic, not moving good air initially.  He has improved after DuoNeb hour long treatment and IV magnesium and IV Solu-Medrol.  He however still is not moving great air, still wheezing on repeat evaluation.  My suspicion is for uncontrolled asthma exacerbation.  Patient is comfortable with being admitted to the hospital for further care and evaluation of symptoms.  I doubt acute coronary syndrome troponin negative doubt pulmonary edema, BNP normal.  Considered PE but felt unlikely diffuse wheezing.  Most likely diagnosis is asthma exacerbation.  Patient be admitted for further care and treatment of his symptoms.  I discussed the case in detail with Dr. Reddy from [facility] Medicine will admit the patient to the hospital           Attending Attestation:             Attending ED Notes:   Attending Critical Care:   Critical Care Times:   Direct Patient Care (initial evaluation, reassessments, and time considering the case)................................................................10 minutes.   Additional History from reviewing old medical records or taking additional history from the family, EMS, PCP, etc.......................10 minutes.   Ordering, Reviewing, and Interpreting Diagnostic Studies...............................................................................................................10 minutes.   Documentation..................................................................................................................................................................................5 minutes.   ==============================================================  · Total Critical Care Time - exclusive of procedural time: 35 minutes.  ==============================================================  Critical care was necessary to treat or prevent imminent or life-threatening deterioration of the following conditions:  Asthma exacerbation, respiratory failure.   Critical care was time spent personally by me on the following activities: obtaining history from patient or relative, examination of patient, review of x-rays / CT sent with the patient, ordering lab, x-rays, and/or EKG, development of treatment plan with patient or relative, ordering and performing treatments and interventions, interpretation of cardiac measurements and re-evaluation of patient's conition.   Critical Care Condition: potentially life-threatening       ED Course as of 01/09/23 2137 Mon Jan 09, 2023 2115 Patient reassess, 92-93% on 2 L.  Still not moving great air, still wheezing.  Will admit to the hospitalist for an asthma exacerbation. [JR]   2137 EKG 7:59 p.m. normal sinus rhythm rate of 74.  Right bundle branch block.  No ST elevation or  depression.  No STEMI.  EKG interpreted by me. [JR]      ED Course User Index  [JR] Obie Bowman DO                 Clinical Impression:   Final diagnoses:  [J45.909] Asthma  [R06.00] Dyspnea  [J45.41] Moderate persistent asthma with exacerbation (Primary)        ED Disposition Condition    Observation                 Obie Bowman,   01/09/23 2136       Obie Bowman,   01/09/23 2138

## 2023-01-10 NOTE — SUBJECTIVE & OBJECTIVE
No past medical history on file.    No past surgical history on file.    Review of patient's allergies indicates:  Not on File    No current facility-administered medications on file prior to encounter.     No current outpatient medications on file prior to encounter.     Family History    None       Tobacco Use    Smoking status: Not on file    Smokeless tobacco: Not on file   Substance and Sexual Activity    Alcohol use: Not on file    Drug use: Not on file    Sexual activity: Not on file     Review of Systems   Constitutional:  Positive for fatigue. Negative for activity change and appetite change.   HENT:  Negative for congestion and sore throat.    Eyes:  Negative for discharge and visual disturbance.   Respiratory:  Positive for shortness of breath and wheezing. Negative for cough and chest tightness.    Cardiovascular:  Negative for chest pain and leg swelling.   Gastrointestinal:  Negative for abdominal pain and nausea.   Genitourinary:  Negative for dysuria and hematuria.   Musculoskeletal:  Negative for myalgias.   Skin:  Negative for pallor and rash.   Neurological:  Negative for dizziness and weakness.   Psychiatric/Behavioral:  Negative for behavioral problems. The patient is not nervous/anxious.    All other systems reviewed and are negative.  Objective:     Vital Signs (Most Recent):  Temp: 97.8 °F (36.6 °C) (01/09/23 1920)  Pulse: 109 (01/09/23 1955)  Resp: (!) 22 (01/09/23 1955)  BP: (!) 151/101 (01/09/23 1955)  SpO2: (!) 94 % (01/09/23 1955)   Vital Signs (24h Range):  Temp:  [97.8 °F (36.6 °C)] 97.8 °F (36.6 °C)  Pulse:  [106-110] 109  Resp:  [20-26] 22  SpO2:  [90 %-95 %] 94 %  BP: (142-151)/() 151/101     Weight: 88.5 kg (195 lb)  Body mass index is 27.2 kg/m².    Physical Exam  Vitals and nursing note reviewed.   Constitutional:       General: He is in acute distress.      Appearance: He is well-developed.   HENT:      Head: Atraumatic.   Neck:      Vascular: No JVD.   Cardiovascular:       Rate and Rhythm: Normal rate and regular rhythm.      Heart sounds: Normal heart sounds. No murmur heard.    No gallop.   Pulmonary:      Effort: Respiratory distress present.      Breath sounds: Wheezing present.      Comments: On supplemental oxygen  Abdominal:      General: Bowel sounds are normal. There is no distension.      Palpations: Abdomen is soft.      Tenderness: There is no guarding or rebound.   Musculoskeletal:      Cervical back: Neck supple.   Lymphadenopathy:      Cervical: No cervical adenopathy.   Skin:     General: Skin is warm.      Capillary Refill: Capillary refill takes less than 2 seconds.      Findings: No rash.   Neurological:      Mental Status: He is alert and oriented to person, place, and time.      Cranial Nerves: No cranial nerve deficit.   Psychiatric:         Behavior: Behavior normal.           Significant Labs: All pertinent labs within the past 24 hours have been reviewed.    Significant Imaging: I have reviewed all pertinent imaging results/findings within the past 24 hours.

## 2023-01-10 NOTE — NURSING
Detail Level: Detailed Patient arrived on unit in stable condition. Oxygen in place at 2 liters via nasal cannula. No complaints of pain at this time. Call light within reach.    Add 99276 Cpt? (Important Note: In 2017 The Use Of 26703 Is Being Tracked By Cms To Determine Future Global Period Reimbursement For Global Periods): no

## 2023-01-10 NOTE — CARE UPDATE
Attempted echo at 8:30 am patient gone from room. Will retry later when patient is settled into new room.

## 2023-01-10 NOTE — PROGRESS NOTES
[facility] Medicine  Progress Note    Patient name: Khari Durbin  MRN: [id number]  Admit Date: 1/9/2023   LOS: 0 days     SUBJECTIVE:     Principal problem: Moderate persistent asthma with exacerbation    Interval History:  Patient was seen and examined bedside, subjectively feels better however not back to baseline, multiple readings of high blood pressure especially diastolic hypertension.  Requiring minimal oxygen that I think he can come off soon.     Scheduled Meds:   albuterol-ipratropium  3 mL Nebulization Q6H    doxycycline  100 mg Oral Q12H    methylPREDNISolone sodium succinate injection  80 mg Intravenous Q6H    polyethylene glycol  17 g Oral BID     Continuous Infusions:  PRN Meds:acetaminophen, acetaminophen, glucagon (human recombinant), glucose, glucose, hydrALAZINE, HYDROcodone-acetaminophen, HYDROcodone-acetaminophen, magnesium oxide, magnesium oxide, melatonin, naloxone, ondansetron, potassium bicarbonate, potassium bicarbonate, potassium bicarbonate, potassium, sodium phosphates, potassium, sodium phosphates, potassium, sodium phosphates, simethicone, sodium chloride 0.9%    Review of patient's allergies indicates:  Not on File    Review of Systems: As per interval history    OBJECTIVE:     Vital Signs (Most Recent)  Temp: 97.4 °F (36.3 °C) (01/10/23 1134)  Pulse: (!) 120 (01/10/23 1352)  Resp: 16 (01/10/23 1352)  BP: 120/88 (01/10/23 1134)  SpO2: 95 % (01/10/23 1352)    Vital Signs Range (Last 24H):  Temp:  [97.4 °F (36.3 °C)-98.2 °F (36.8 °C)]   Pulse:  []   Resp:  [16-30]   BP: (120-163)/()   SpO2:  [90 %-95 %]     I & O (Last 24H):  Intake/Output Summary (Last 24 hours) at 1/10/2023 1427  Last data filed at 1/10/2023 1315  Gross per 24 hour   Intake 530 ml   Output --   Net 530 ml       Physical Exam:  General: Patient resting comfortably in no acute distress. Appears as stated age. Calm  Eyes: No conjunctival injection. No scleral icterus.  ENT: Hearing  grossly intact. No discharge from ears. No nasal discharge.   Neck: Supple, trachea midline. No JVD  CVS: RRR. No LE edema BL  Lungs:  On supplemental oxygen, bilateral wheezing noted, improved air entry bilaterally   Abdomen:  Soft, nontender and nondistended.  No organomegaly  Neuro: AOx3. Moves all extremities. Follows commands. Responds appropriately   Skin:  No rash or erythema noted    Laboratory:  I have reviewed all pertinent lab results within the past 24 hours.    Diagnostic Results:  Reviewed all imaging    ASSESSMENT/PLAN:     47-year-old gentleman with history of childhood asthma struggling with URI for last 3 weeks came with severe wheezing, respiratory distress.    Active Hospital Problems    Diagnosis  POA    *Moderate persistent asthma with exacerbation [J45.41]  Yes    Wheezing [R06.2]  Yes    Current vaping on some days [Z72.89]  Not Applicable    Elevated blood pressure reading without diagnosis of hypertension [R03.0]  Yes      Resolved Hospital Problems   No resolved problems to display.         Plan:   Overall clinical picture consistent with asthma exacerbation, of note patient vapes nicotine products  DuoNebs, IV steroids, doxycycline, clinically improving however not at baseline yet  2D echo findings noted  Wean oxygen as tolerated  Tachycardic likely due to albuterol, if persistent tachycardia we may pursue CTA chest  Telemetry monitoring  Counseled extensively on vaping cessation and educated on vaping associated lung injury  Multiple elevated blood pressure reading noted, will start low-dose HCTZ   Out of bed to chair, encourage ambulation  Pt will need outpatient PFTs  Updated wife at bedside      VTE Risk Mitigation (From admission, onward)           Ordered     IP VTE LOW RISK PATIENT  Once         01/09/23 2133     Place sequential compression device  Until discontinued         01/09/23 2133                        Department Hospital Medicine  [facility]  MD Maureen  Date of service: 01/10/2023

## 2023-01-10 NOTE — FIRST PROVIDER EVALUATION
Medical screening examination initiated.  I have conducted a focused provider triage encounter, findings are as follows:    Brief history of present illness:  History of asthma, SOB x 3 weeks. Worsening today. Sent from urgent care and was given steroid shot (Decadron  IM)     Vitals:    01/09/23 1920   BP: (!) 142/87   Pulse: 110   Resp: (!) 26   Temp: 97.8 °F (36.6 °C)   TempSrc: Oral   SpO2: (!) 90%       Pertinent physical exam:  Inspiratory and expiratory wheezing, tachycardia. SpO2 89% on room air.     Brief workup plan:  CXR, duonebs. Placed on oxygen in triage. Nebs ordered    Preliminary workup initiated; this workup will be continued and followed by the physician or advanced practice provider that is assigned to the patient when roomed.

## 2023-01-10 NOTE — PLAN OF CARE
01/10/23 0949   Patient Assessment/Suction   Level of Consciousness (AVPU) alert   Respiratory Effort Normal;Unlabored   Expansion/Accessory Muscles/Retractions no retractions;no use of accessory muscles   All Lung Fields Breath Sounds wheezes, expiratory   Rhythm/Pattern, Respiratory unlabored;pattern regular;depth regular   PRE-TX-O2   Device (Oxygen Therapy) nasal cannula   $ Is the patient on Low Flow Oxygen? Yes   Flow (L/min) 2   SpO2 95 %   Pulse Oximetry Type Intermittent   $ Pulse Oximetry - Multiple Charge Pulse Oximetry - Multiple   Pulse (!) 113   Resp 18   Aerosol Therapy   $ Aerosol Therapy Charges Aerosol Treatment   Daily Review of Necessity (SVN) completed   Respiratory Treatment Status (SVN) given   Treatment Route (SVN) oxygen;mask   Patient Position (SVN) HOB elevated   Post Treatment Assessment (SVN) breath sounds unchanged   Signs of Intolerance (SVN) none   Breath Sounds Post-Respiratory Treatment   Post-treatment Heart Rate (beats/min) 101   Post-treatment Resp Rate (breaths/min) 20

## 2023-01-11 VITALS
HEIGHT: 71 IN | DIASTOLIC BLOOD PRESSURE: 88 MMHG | RESPIRATION RATE: 18 BRPM | OXYGEN SATURATION: 95 % | BODY MASS INDEX: 26.91 KG/M2 | WEIGHT: 192.25 LBS | SYSTOLIC BLOOD PRESSURE: 121 MMHG | TEMPERATURE: 98 F | HEART RATE: 91 BPM

## 2023-01-11 PROBLEM — R06.2 WHEEZING: Status: RESOLVED | Noted: 2023-01-09 | Resolved: 2023-01-11

## 2023-01-11 LAB
ALBUMIN SERPL BCP-MCNC: 4.2 G/DL (ref 3.5–5.2)
ALP SERPL-CCNC: 53 U/L (ref 55–135)
ALT SERPL W/O P-5'-P-CCNC: 32 U/L (ref 10–44)
ANION GAP SERPL CALC-SCNC: 10 MMOL/L (ref 8–16)
AST SERPL-CCNC: 21 U/L (ref 10–40)
BILIRUB SERPL-MCNC: 0.5 MG/DL (ref 0.1–1)
BUN SERPL-MCNC: 21 MG/DL (ref 6–20)
CALCIUM SERPL-MCNC: 9.5 MG/DL (ref 8.7–10.5)
CHLORIDE SERPL-SCNC: 101 MMOL/L (ref 95–110)
CO2 SERPL-SCNC: 26 MMOL/L (ref 23–29)
CREAT SERPL-MCNC: 0.9 MG/DL (ref 0.5–1.4)
EST. GFR  (NO RACE VARIABLE): >60 ML/MIN/1.73 M^2
GLUCOSE SERPL-MCNC: 147 MG/DL (ref 70–110)
MAGNESIUM SERPL-MCNC: 2 MG/DL (ref 1.6–2.6)
PHOSPHATE SERPL-MCNC: 3.6 MG/DL (ref 2.7–4.5)
POTASSIUM SERPL-SCNC: 4.2 MMOL/L (ref 3.5–5.1)
PROT SERPL-MCNC: 7.8 G/DL (ref 6–8.4)
SODIUM SERPL-SCNC: 137 MMOL/L (ref 136–145)

## 2023-01-11 PROCEDURE — 36415 COLL VENOUS BLD VENIPUNCTURE: CPT | Performed by: HOSPITALIST

## 2023-01-11 PROCEDURE — 94640 AIRWAY INHALATION TREATMENT: CPT

## 2023-01-11 PROCEDURE — 94761 N-INVAS EAR/PLS OXIMETRY MLT: CPT

## 2023-01-11 PROCEDURE — 84100 ASSAY OF PHOSPHORUS: CPT | Performed by: HOSPITALIST

## 2023-01-11 PROCEDURE — 80053 COMPREHEN METABOLIC PANEL: CPT | Performed by: HOSPITALIST

## 2023-01-11 PROCEDURE — G0378 HOSPITAL OBSERVATION PER HR: HCPCS

## 2023-01-11 PROCEDURE — 96376 TX/PRO/DX INJ SAME DRUG ADON: CPT

## 2023-01-11 PROCEDURE — 63600175 PHARM REV CODE 636 W HCPCS: Performed by: HOSPITALIST

## 2023-01-11 PROCEDURE — 25000003 PHARM REV CODE 250: Performed by: HOSPITALIST

## 2023-01-11 PROCEDURE — 83735 ASSAY OF MAGNESIUM: CPT | Performed by: HOSPITALIST

## 2023-01-11 PROCEDURE — 25000242 PHARM REV CODE 250 ALT 637 W/ HCPCS: Performed by: HOSPITALIST

## 2023-01-11 RX ORDER — ALBUTEROL SULFATE 90 UG/1
2 AEROSOL, METERED RESPIRATORY (INHALATION) EVERY 6 HOURS PRN
Qty: 18 G | Refills: 2 | Status: SHIPPED | OUTPATIENT
Start: 2023-01-11 | End: 2024-01-11

## 2023-01-11 RX ORDER — PREDNISONE 10 MG/1
TABLET ORAL
Qty: 40 TABLET | Refills: 0 | Status: SHIPPED | OUTPATIENT
Start: 2023-01-11 | End: 2023-01-27

## 2023-01-11 RX ORDER — HYDROCHLOROTHIAZIDE 12.5 MG/1
12.5 TABLET ORAL DAILY
Qty: 30 TABLET | Refills: 0 | Status: SHIPPED | OUTPATIENT
Start: 2023-01-12 | End: 2023-02-11

## 2023-01-11 RX ORDER — FLUTICASONE PROPIONATE AND SALMETEROL 250; 50 UG/1; UG/1
POWDER RESPIRATORY (INHALATION) EVERY 12 HOURS
Qty: 60 EACH | Refills: 2 | Status: SHIPPED | OUTPATIENT
Start: 2023-01-11 | End: 2023-05-14

## 2023-01-11 RX ORDER — DOXYCYCLINE 100 MG/1
100 CAPSULE ORAL EVERY 12 HOURS
Qty: 10 CAPSULE | Refills: 0 | Status: SHIPPED | OUTPATIENT
Start: 2023-01-11 | End: 2023-01-16

## 2023-01-11 RX ADMIN — IPRATROPIUM BROMIDE AND ALBUTEROL SULFATE 3 ML: 2.5; .5 SOLUTION RESPIRATORY (INHALATION) at 02:01

## 2023-01-11 RX ADMIN — METHYLPREDNISOLONE SODIUM SUCCINATE 80 MG: 125 INJECTION, POWDER, FOR SOLUTION INTRAMUSCULAR; INTRAVENOUS at 02:01

## 2023-01-11 RX ADMIN — METHYLPREDNISOLONE SODIUM SUCCINATE 80 MG: 125 INJECTION, POWDER, FOR SOLUTION INTRAMUSCULAR; INTRAVENOUS at 08:01

## 2023-01-11 RX ADMIN — DOXYCYCLINE HYCLATE 100 MG: 100 CAPSULE ORAL at 08:01

## 2023-01-11 RX ADMIN — IPRATROPIUM BROMIDE AND ALBUTEROL SULFATE 3 ML: 2.5; .5 SOLUTION RESPIRATORY (INHALATION) at 08:01

## 2023-01-11 RX ADMIN — HYDROCHLOROTHIAZIDE 12.5 MG: 12.5 TABLET ORAL at 08:01

## 2023-01-11 NOTE — PLAN OF CARE
Secure email sent to Precious with [employer] to establish primary care and schedule hospital follow up appointment.  Precious to follow up with appointment information but will also reach out to patient to inform.

## 2023-01-11 NOTE — PLAN OF CARE
Hospital follow up and PCP establishment appointment made per Precious with [facility] for 1/24/23 at 1100 with KETURAH Harris.  Added to AVS.     Discharge orders and chart reviewed with no further post-acute discharge needs identified at this time.  At this time, patient is cleared for discharge from Case Management standpoint.        01/11/23 1233   Final Note   Assessment Type Final Discharge Note   Anticipated Discharge Disposition Home   Hospital Resources/Appts/Education Provided Appointments scheduled and added to AVS   Post-Acute Status   Post-Acute Authorization Other   Other Status No Post-Acute Service Needs   Discharge Delays None known at this time

## 2023-01-11 NOTE — CARE UPDATE
01/11/23 0814   Patient Assessment/Suction   Level of Consciousness (AVPU) alert   Respiratory Effort Unlabored   Expansion/Accessory Muscles/Retractions no use of accessory muscles   All Lung Fields Breath Sounds clear;diminished   Rhythm/Pattern, Respiratory unlabored   Cough Frequency infrequent   Cough Type productive   PRE-TX-O2   Device (Oxygen Therapy) room air   SpO2 97 %   Pulse Oximetry Type Intermittent   $ Pulse Oximetry - Multiple Charge Pulse Oximetry - Multiple   Pulse 102   Resp 20   Positioning   Body Position position changed independently   Head of Bed (HOB) Positioning HOB elevated   Aerosol Therapy   $ Aerosol Therapy Charges Aerosol Treatment   Daily Review of Necessity (SVN) completed   Respiratory Treatment Status (SVN) given   Treatment Route (SVN) mask;oxygen   Patient Position (SVN) HOB elevated   Post Treatment Assessment (SVN) increased aeration   Breath Sounds Post-Respiratory Treatment   Throughout All Fields Post-Treatment All Fields   Throughout All Fields Post-Treatment aeration increased   Post-treatment Heart Rate (beats/min) 103   Post-treatment Resp Rate (breaths/min) 18

## 2023-01-11 NOTE — DISCHARGE SUMMARY
Critical access hospital Medicine  Discharge Summary      Patient Name: Khari Durbin  MRN: [id number]  SILVIA: [id number]  Patient Class: OP- Observation  Admission Date: 1/9/2023  Hospital Length of Stay: 0 days  Discharge Date and Time: 1/11/2023  2:04 PM  Attending Physician: Zaida att. providers found   Discharging Provider: Zena Reddy MD  Primary Care Provider: Primary Doctor Zaida    Primary Care Team: Networked reference to record PCT     HPI:   47-year-old gentleman with prior history of childhood asthma came with chief complaint of shortness of breath.  Upon further asking patient mentioned that 3 weeks ago he contracted very bad upper respiratory infection however subsequently got better.  Unfortunately for last 1 week he has worsening dyspnea, wheezing and decided to come to ED. otherwise denies any fever, chills, headache, dizziness, chest pain, palpitations, nausea, vomiting, bladder or bowel symptoms.     Past medical history:  Childhood asthma  Family history:  Reviewed and noncontributory  Social history:  Quit smoking 9 years ago however he continues to vape, denies excessive alcohol use or recreational drug use, works in [employer] with very ester environment  Surgical history:  Reviewed and noncontributory      * No surgery found *      Hospital Course:   47-year-old gentleman with history of childhood asthma struggling with URI for last 3 weeks came with severe wheezing, respiratory distress.  Overall presentation was consistent with asthma exacerbation and accelerated hypertension without prior diagnosis of hypertension.  Patient responded well with antibiotics, DuoNebs.  Due to multiple elevated reading of blood pressure we started small dose HCTZ and patient blood pressure responded very well.  Patient was also educated on weeping associated health problems and I provided literature as well.  I counseled him extensively on vaping cessation.  Since yesterday patient is not requiring  oxygen, today on my exam patient has no wheezing and has good bilateral air entry.  He was discharged home in hemodynamically stable condition with prescription of 5 more days of doxycycline, Advair, p.r.n. albuterol, long tapering steroid regimen.  He was advised to follow-up with his PCP who can arrange outpatient PFT and further optimize his antihypertensive regimen if needed.     I have seen the patient on the day of discharge and reviewed the discharge instructions as outlined below. Patient verbalized understanding and is aware to contact primary care physician or return to ED if new or worsening symptoms.        Goals of Care Treatment Preferences:  Code Status: Full Code        Final Active Diagnoses:    Diagnosis Date Noted POA    PRINCIPAL PROBLEM:  Moderate persistent asthma with exacerbation [J45.41] 01/09/2023 Yes    Current vaping on some days [Z72.89] 01/09/2023 Not Applicable    Elevated blood pressure reading without diagnosis of hypertension [R03.0] 01/09/2023 Yes      Problems Resolved During this Admission:    Diagnosis Date Noted Date Resolved POA    Wheezing [R06.2] 01/09/2023 01/11/2023 Yes       Discharged Condition: good    Disposition: Home or Self Care    Follow Up:   Follow-up Information     PCP Follow up.           [facility] Follow up on 1/24/2023.    Why: Hospital follow up 1/24/2023 at 11:00 with KETURAH Harris  Contact information:  [address] [place] [address]  [phone]                       Patient Instructions:      Diet Cardiac     Notify your health care provider if you experience any of the following:  temperature >100.4     Notify your health care provider if you experience any of the following:  difficulty breathing or increased cough     Notify your health care provider if you experience any of the following:   Order Comments: Blood in sputum     Activity as tolerated       Significant Diagnostic Studies: Labs: All labs within  the past 24 hours have been reviewed    Pending Diagnostic Studies:     None         Medications:  Reconciled Home Medications:      Medication List      START taking these medications    albuterol 90 mcg/actuation inhaler  Commonly known as: VENTOLIN HFA  Inhale 2 puffs into the lungs every 6 (six) hours as needed for Wheezing. Rescue     doxycycline 100 MG Cap  Commonly known as: VIBRAMYCIN  Take 1 capsule (100 mg total) by mouth every 12 (twelve) hours. for 5 days     hydroCHLOROthiazide 12.5 MG Tab  Commonly known as: HYDRODIURIL  Take 1 tablet (12.5 mg total) by mouth once daily.  Start taking on: January 12, 2023     predniSONE 10 MG tablet  Commonly known as: DELTASONE  Take 4 tablets (40 mg total) by mouth once daily for 4 days, THEN 3 tablets (30 mg total) once daily for 4 days, THEN 2 tablets (20 mg total) once daily for 4 days, THEN 1 tablet (10 mg total) once daily for 4 days.  Start taking on: January 11, 2023     WIXELA INHUB 250-50 mcg/dose diskus inhaler  Generic drug: fluticasone-salmeterol 250-50 mcg/dose  Inhale 1 puff into the lungs every 12 (twelve) hours.            Indwelling Lines/Drains at time of discharge:   Lines/Drains/Airways     None                 Time spent on the discharge of patient: 35 minutes         Zena Reddy MD  Department of Hospital Medicine  [facility]

## 2023-01-11 NOTE — HOSPITAL COURSE
47-year-old gentleman with history of childhood asthma struggling with URI for last 3 weeks came with severe wheezing, respiratory distress.  Overall presentation was consistent with asthma exacerbation and accelerated hypertension without prior diagnosis of hypertension.  Patient responded well with antibiotics, DuoNebs.  Due to multiple elevated reading of blood pressure we started small dose HCTZ and patient blood pressure responded very well.  Patient was also educated on weeping associated health problems and I provided literature as well.  I counseled him extensively on vaping cessation.  Since yesterday patient is not requiring oxygen, today on my exam patient has no wheezing and has good bilateral air entry.  He was discharged home in hemodynamically stable condition with prescription of 5 more days of doxycycline, Advair, p.r.n. albuterol, long tapering steroid regimen.  He was advised to follow-up with his PCP who can arrange outpatient PFT and further optimize his antihypertensive regimen if needed.     I have seen the patient on the day of discharge and reviewed the discharge instructions as outlined below. Patient verbalized understanding and is aware to contact primary care physician or return to ED if new or worsening symptoms.

## 2023-01-11 NOTE — NURSING
Discharge instructions reviewed with and given to patient. Verbalized understanding. IV removed without difficulty, catheter intact. Medications delivered to room. Awaiting personal transportation.

## 2023-01-12 LAB
BACTERIA SPEC AEROBE CULT: NORMAL
GRAM STN SPEC: NORMAL

## 2023-02-22 ENCOUNTER — TELEPHONE (OUTPATIENT)
Dept: PHARMACY | Facility: CLINIC | Age: 48
End: 2023-02-22

## 2023-02-22 NOTE — TELEPHONE ENCOUNTER
I have spoken with Khari Durbin and informed him of the [employer] application process for Advair and what's required to apply.  Khari Durbin will provide the following documents: Proof of household Income( such as social security statement, 1099 form, pension statement or 3 consecutive pay stubs, Copy of all Insurance cards( front and back), and OCCP & Patient Authorization Forms      I will follow up with the patient in 5 business days.

## 2023-02-23 NOTE — TELEPHONE ENCOUNTER
Khari Durbin has provided the necessary documents today to begin the enrollment process into the [employer] program. The prescription portion of the application has been sent to the providers office for approval and signature.

## 2023-02-24 ENCOUNTER — PATIENT MESSAGE (OUTPATIENT)
Dept: RESEARCH | Facility: HOSPITAL | Age: 48
End: 2023-02-24

## 2023-03-01 NOTE — TELEPHONE ENCOUNTER
Patient is requesting pharmacy assistance from the [employer] program for Advair. The application requires a hardcopy prescription to be apart of the supporting documents. Please fax a prescription to the Pharmacy Patient assistance team at [phone]

## 2023-04-05 NOTE — TELEPHONE ENCOUNTER
Khari Durbin has been denied by [employer] Patient Assistance Program.    Denial Date: 03/13/23  Denial Reason:   Program ended 03/01/23     Patient has been notified of this decision via PHONE

## 2023-04-20 DIAGNOSIS — J45.30 MILD PERSISTENT ASTHMA, UNCOMPLICATED: Primary | ICD-10-CM

## 2023-05-02 ENCOUNTER — HOSPITAL ENCOUNTER (OUTPATIENT)
Dept: PULMONOLOGY | Facility: HOSPITAL | Age: 48
Discharge: HOME | End: 2023-05-02

## 2023-05-02 DIAGNOSIS — J45.30 MILD PERSISTENT ASTHMA, UNCOMPLICATED: ICD-10-CM

## 2023-05-02 PROCEDURE — 94010 BREATHING CAPACITY TEST: CPT

## 2023-05-02 PROCEDURE — 94729 DIFFUSING CAPACITY: CPT

## 2023-05-02 PROCEDURE — 94727 GAS DIL/WSHOT DETER LNG VOL: CPT
